# Patient Record
Sex: FEMALE | Race: BLACK OR AFRICAN AMERICAN | URBAN - METROPOLITAN AREA
[De-identification: names, ages, dates, MRNs, and addresses within clinical notes are randomized per-mention and may not be internally consistent; named-entity substitution may affect disease eponyms.]

---

## 2022-08-25 ENCOUNTER — HOSPITAL ENCOUNTER (EMERGENCY)
Age: 67
Discharge: HOME | End: 2022-08-25
Attending: FAMILY MEDICINE
Payer: MEDICARE

## 2022-08-25 ENCOUNTER — APPOINTMENT (OUTPATIENT)
Dept: CT IMAGING | Age: 67
End: 2022-08-25
Attending: FAMILY MEDICINE
Payer: MEDICARE

## 2022-08-25 ENCOUNTER — APPOINTMENT (OUTPATIENT)
Dept: GENERAL RADIOLOGY | Age: 67
End: 2022-08-25
Attending: FAMILY MEDICINE
Payer: MEDICARE

## 2022-08-25 VITALS
HEIGHT: 62 IN | HEART RATE: 79 BPM | RESPIRATION RATE: 18 BRPM | TEMPERATURE: 98.6 F | OXYGEN SATURATION: 99 % | BODY MASS INDEX: 30.73 KG/M2 | WEIGHT: 167 LBS | SYSTOLIC BLOOD PRESSURE: 149 MMHG | DIASTOLIC BLOOD PRESSURE: 71 MMHG

## 2022-08-25 DIAGNOSIS — M51.36 DEGENERATIVE DISC DISEASE, LUMBAR: Primary | ICD-10-CM

## 2022-08-25 DIAGNOSIS — M25.551 RIGHT HIP PAIN: ICD-10-CM

## 2022-08-25 PROCEDURE — 72131 CT LUMBAR SPINE W/O DYE: CPT

## 2022-08-25 PROCEDURE — 99284 EMERGENCY DEPT VISIT MOD MDM: CPT

## 2022-08-25 PROCEDURE — 73502 X-RAY EXAM HIP UNI 2-3 VIEWS: CPT

## 2022-08-25 PROCEDURE — 74011250636 HC RX REV CODE- 250/636: Performed by: FAMILY MEDICINE

## 2022-08-25 PROCEDURE — 96372 THER/PROPH/DIAG INJ SC/IM: CPT

## 2022-08-25 RX ORDER — LIDOCAINE 4 G/100G
1 PATCH TOPICAL ONCE
Status: DISCONTINUED | OUTPATIENT
Start: 2022-08-25 | End: 2022-08-25 | Stop reason: HOSPADM

## 2022-08-25 RX ORDER — PREDNISONE 20 MG/1
40 TABLET ORAL DAILY
Qty: 10 TABLET | Refills: 0 | Status: SHIPPED | OUTPATIENT
Start: 2022-08-25 | End: 2022-08-30

## 2022-08-25 RX ORDER — KETOROLAC TROMETHAMINE 15 MG/ML
15 INJECTION, SOLUTION INTRAMUSCULAR; INTRAVENOUS
Status: COMPLETED | OUTPATIENT
Start: 2022-08-25 | End: 2022-08-25

## 2022-08-25 RX ORDER — LIDOCAINE 4 G/100G
PATCH TOPICAL
Qty: 5 PATCH | Refills: 0 | Status: SHIPPED | OUTPATIENT
Start: 2022-08-25

## 2022-08-25 RX ADMIN — KETOROLAC TROMETHAMINE 15 MG: 15 INJECTION, SOLUTION INTRAMUSCULAR; INTRAVENOUS at 19:04

## 2022-08-25 NOTE — ED NOTES
Pt a&ox4. GCS 15. Pt self ambulated out of ED with discharge paperwork and personal belongings and daughter.

## 2022-08-25 NOTE — ED TRIAGE NOTES
Pt has right lower back pain that has now caused right hip pain, and right leg pain that feels like burning shooting pain. Pt has taken tylenol and meloxicam for her pain today. Pt denies any injuries or falls.

## 2022-08-27 NOTE — ED PROVIDER NOTES
EMERGENCY DEPARTMENT HISTORY AND PHYSICAL EXAM      Date: 8/25/2022  Patient Name: Soham Riggs    History of Presenting Illness     Chief Complaint   Patient presents with    Back Pain    Hip Pain    Leg Pain       History Provided By:     HPI: Soham Riggs, is a very pleasant 75 y.o. female presenting to the ED with a chief complaint of hip pain, back pain, leg pain. Patient has been experiencing right lower back pain that now goes into her right hip. Worse with walking. Denies any inciting injury. Occasional shooting pains down back of leg. No numbness tingling or weakness in extremities. No saddle anesthesia. No difficulty urinating or stooling. No fevers. The patient denies any other symptoms at this time. PCP: Nhi Ochoa MD    No current facility-administered medications on file prior to encounter. Current Outpatient Medications on File Prior to Encounter   Medication Sig Dispense Refill    atorvastatin (LIPITOR) 10 mg tablet Take  by mouth daily. amLODIPine (NORVASC) 5 mg tablet Take 5 mg by mouth daily. ketoconazole (NIZORAL) 2 % shampoo Apply  to affected area daily as needed. calcium carbonate-vitamin d2 600-200 mg-unit Cap Take  by mouth daily. Omega-3-DHA-EPA-Fish Oil (FISH OIL) 1,000 (120-180) mg Cap Take 2,000 mg by mouth nightly. MULTI-VITAMIN PO Take  by mouth daily. docusate sodium (COLACE) 100 mg capsule Take 200 mg by mouth nightly. glucosamines, hcl,sulf &acetyl (GLUCOSAMINE COMPLEX) 1 gram Tab Take  by mouth two (2) times a day. carbamide peroxide (DEBROX) 6.5 % otic solution Administer 5 Drops into each ear two (2) times a day. esomeprazole (NEXIUM) 40 mg capsule Take 40 mg by mouth nightly. hydrochlorothiazide (MICROZIDE) 12.5 mg capsule Take 12.5 mg by mouth every morning.          Past History     Past Medical History:  Past Medical History:   Diagnosis Date    Cold intolerance     GERD (gastroesophageal reflux disease)     Heart disease     HTN (hypertension)     Joint pain     Multiple stiff joints     Thyroid disease        Past Surgical History:  Past Surgical History:   Procedure Laterality Date    HX  SECTION      HX HYSTERECTOMY         Family History:  Family History   Problem Relation Age of Onset    Diabetes Sister     Hypertension Mother     Hypertension Father     Hypertension Sister        Social History:  Social History     Tobacco Use    Smoking status: Never    Smokeless tobacco: Never   Substance Use Topics    Alcohol use: No    Drug use: No       Allergies: Allergies   Allergen Reactions    Flu Medicine Hives and Rash         Review of Systems     Review of Systems   Constitutional:  Negative for activity change, appetite change, chills, fatigue and fever. HENT:  Negative for congestion and sore throat. Eyes:  Negative for photophobia and visual disturbance. Respiratory:  Negative for cough, shortness of breath and wheezing. Cardiovascular:  Negative for chest pain, palpitations and leg swelling. Gastrointestinal:  Negative for abdominal pain, diarrhea, nausea and vomiting. Endocrine: Negative for cold intolerance and heat intolerance. Musculoskeletal:  Positive for back pain. Negative for gait problem and joint swelling. Hip pain   Skin:  Negative for color change and rash. Neurological:  Negative for dizziness and headaches. Physical Exam     Physical Exam  Constitutional:       Appearance: She is well-developed. HENT:      Head: Normocephalic and atraumatic. Mouth/Throat:      Mouth: Mucous membranes are moist.      Pharynx: Oropharynx is clear. Eyes:      Conjunctiva/sclera: Conjunctivae normal.      Pupils: Pupils are equal, round, and reactive to light. Cardiovascular:      Rate and Rhythm: Normal rate and regular rhythm. Heart sounds: No murmur heard. Pulmonary:      Effort: No respiratory distress. Breath sounds: No stridor.  No wheezing, rhonchi or rales. Abdominal:      General: There is no distension. Tenderness: There is no abdominal tenderness. There is no rebound. Musculoskeletal:      Cervical back: Normal range of motion and neck supple. Comments: Mild tenderness to palpation to mid lumbar spine. No deformity or step-off. Tenderness over right greater trochanter. Patient ambulatory. No leg length discrepancy. No internal nor external rotation. Skin:     General: Skin is warm and dry. Neurological:      General: No focal deficit present. Mental Status: She is alert and oriented to person, place, and time. Psychiatric:         Mood and Affect: Mood normal.         Behavior: Behavior normal.       Lab and Diagnostic Study Results     Labs -   No results found for this or any previous visit (from the past 12 hour(s)). Radiologic Studies -   @lastxrresult@  CT Results  (Last 48 hours)                 08/25/22 8195  CT SPINE LUMB WO CONT Final result    Impression:      1. No evidence of fracture or subluxation. 2.  Degenerative changes in lower lumbar spine as described        Narrative:  INDICATION: Lower back pain       COMPARISON: No comparisons. Technique: Following conventional myelogram performed earlier the same day,   axial CT imaging was performed through the lumbar spine. Sagittal and coronal   reconstructions were obtained. CT dose reduction was achieved through the use of a standardized protocol   tailored for this examination and automatic exposure control for dose   modulation. FINDINGS:       No evidence of fracture or subluxation. T12-L1: The spinal canal and foramina are patent. L1-2: The spinal canal and foramina are patent. L2-3: Mild disc bulge without canal stenosis or foraminal narrowing. .       L3-4: Mild concentric disc bulge and facet and ligamentum flavum hypertrophy   with mild canal stenosis. No significant foraminal narrowing. L4-5: Concentric disc bulge with mild to moderate canal stenosis and facet and   ligamentum flavum hypertrophy. Mild left foraminal narrowing. Blanchie Bevels L5-S1: Facet hypertrophy. Mild disc bulge. No significant canal stenosis. No   significant foraminal narrowing. CXR Results  (Last 48 hours)      None              Medical Decision Making   - I am the first provider for this patient. - I reviewed the vital signs, available nursing notes, past medical history, past surgical history, family history and social history. - Initial assessment performed. The patients presenting problems have been discussed, and they are in agreement with the care plan formulated and outlined with them. I have encouraged them to ask questions as they arise throughout their visit. Vital Signs-Reviewed the patient's vital signs. No data found. ED Course/ Provider Notes (Medical Decision Making):     Patient presented to the emergency department with the aforementioned chief complaint. On examination the patient is nontoxic. Vitals were reviewed per above. No signs of infectious process no cauda equina. CT lumbar spine demonstrates degenerative changes. Entire report reviewed with patient. Hip x-ray negative. Discussed supportive measures for symptoms. Information to follow-up with orthopedic spine on outpatient basis. Jodie Hernandez was given a thorough list of signs and symptoms that would warrant an immediate return to the emergency department. Otherwise Jodie Hernandez will follow up with PCP. Procedures   Medical Decision Makingedical Decision Making  Performed by: Ian Cole DO  Procedures  None       Disposition   Disposition:     Home     All of the diagnostic tests were reviewed and questions answered. Diagnosis, care plan and treatment options were discussed. The patient understands the instructions and will follow up as directed.  The patients results have been reviewed with them.  They have been counseled regarding their diagnosis. The patient verbally convey understanding and agreement of the signs, symptoms, diagnosis, treatment and prognosis and additionally agrees to follow up as recommended with their PCP in 24 - 48 hours. They also agree with the care-plan and convey that all of their questions have been answered. I have also put together some discharge instructions for them that include: 1) educational information regarding their diagnosis, 2) how to care for their diagnosis at home, as well a 3) list of reasons why they would want to return to the ED prior to their follow-up appointment, should their condition change. DISCHARGE PLAN:    1. Cannot display discharge medications since this patient is not currently admitted. 2.   Follow-up Information       Follow up With Specialties Details Why Contact Info    Your primary care doctor  Schedule an appointment as soon as possible for a visit in 2 days      [facility] [address]  Call   [address]  [phone]              3.  Return to ED if worse       4. Discharge Medication List as of 8/25/2022  7:01 PM        START taking these medications    Details   predniSONE (DELTASONE) 20 mg tablet Take 2 Tablets by mouth daily for 5 days. With Breakfast, Normal, Disp-10 Tablet, R-0      lidocaine 4 % patch Apply to affected area daily not to exceed 12 hours a day, Normal, Disp-5 Patch, R-0           CONTINUE these medications which have NOT CHANGED    Details   atorvastatin (LIPITOR) 10 mg tablet Take  by mouth daily. Historical Med      amLODIPine (NORVASC) 5 mg tablet Take 5 mg by mouth daily. Historical Med, 5 mg      ketoconazole (NIZORAL) 2 % shampoo Apply  to affected area daily as needed. Historical Med      calcium carbonate-vitamin d2 600-200 mg-unit Cap Take  by mouth daily. Historical Med      Omega-3-DHA-EPA-Fish Oil (FISH OIL) 1,000 (120-180) mg Cap Take 2,000 mg by mouth nightly. Historical Med, 2,000 mg      MULTI-VITAMIN PO Take  by mouth daily. Historical Med      docusate sodium (COLACE) 100 mg capsule Take 200 mg by mouth nightly. Historical Med, 200 mg      glucosamines, hcl,sulf &acetyl (GLUCOSAMINE COMPLEX) 1 gram Tab Take  by mouth two (2) times a day. Historical Med      carbamide peroxide (DEBROX) 6.5 % otic solution Administer 5 Drops into each ear two (2) times a day. Historical Med, 5 Drop      esomeprazole (NEXIUM) 40 mg capsule Take 40 mg by mouth nightly. Historical Med, 40 mg      hydrochlorothiazide (MICROZIDE) 12.5 mg capsule Take 12.5 mg by mouth every morning. Historical Med, 12.5 mg               Diagnosis     Clinical Impression:    1. Degenerative disc disease, lumbar    2. Right hip pain        Attestations:    Murali Sullivan, DO    Please note that this dictation was completed with [employer], the computer voice recognition software. Quite often unanticipated grammatical, syntax, homophones, and other interpretive errors are inadvertently transcribed by the computer software. Please disregard these errors. Please excuse any errors that have escaped final proofreading. Thank you.

## 2022-10-20 ENCOUNTER — HOSPITAL ENCOUNTER (EMERGENCY)
Age: 67
Discharge: HOME | End: 2022-10-20
Attending: FAMILY MEDICINE | Admitting: FAMILY MEDICINE
Payer: MEDICARE

## 2022-10-20 VITALS
SYSTOLIC BLOOD PRESSURE: 146 MMHG | TEMPERATURE: 98.3 F | RESPIRATION RATE: 19 BRPM | DIASTOLIC BLOOD PRESSURE: 80 MMHG | HEIGHT: 62 IN | WEIGHT: 159 LBS | BODY MASS INDEX: 29.26 KG/M2 | HEART RATE: 72 BPM | OXYGEN SATURATION: 96 %

## 2022-10-20 DIAGNOSIS — M54.17 LUMBOSACRAL RADICULOPATHY: Primary | ICD-10-CM

## 2022-10-20 PROCEDURE — 99284 EMERGENCY DEPT VISIT MOD MDM: CPT | Performed by: FAMILY MEDICINE

## 2022-10-20 PROCEDURE — 74011250637 HC RX REV CODE- 250/637

## 2022-10-20 PROCEDURE — 74011250636 HC RX REV CODE- 250/636

## 2022-10-20 PROCEDURE — 74011000250 HC RX REV CODE- 250

## 2022-10-20 PROCEDURE — 96372 THER/PROPH/DIAG INJ SC/IM: CPT | Performed by: FAMILY MEDICINE

## 2022-10-20 RX ORDER — ACETAMINOPHEN 325 MG/1
975 TABLET ORAL
Status: COMPLETED | OUTPATIENT
Start: 2022-10-20 | End: 2022-10-20

## 2022-10-20 RX ORDER — LIDOCAINE 4 G/100G
1 PATCH TOPICAL EVERY 24 HOURS
Status: DISCONTINUED | OUTPATIENT
Start: 2022-10-20 | End: 2022-10-20 | Stop reason: HOSPADM

## 2022-10-20 RX ADMIN — METHYLPREDNISOLONE SODIUM SUCCINATE 125 MG: 125 INJECTION, POWDER, FOR SOLUTION INTRAMUSCULAR; INTRAVENOUS at 14:08

## 2022-10-20 RX ADMIN — ACETAMINOPHEN 975 MG: 325 TABLET ORAL at 14:08

## 2022-10-20 NOTE — ED TRIAGE NOTES
Pt reports right back pain for 2 days not relieved by otc pain medication. Pt states she took 800 mg of motrin at 1000 today.

## 2022-10-20 NOTE — ED PROVIDER NOTES
EMERGENCY DEPARTMENT HISTORY AND PHYSICAL EXAM      Date: 10/20/2022  Patient Name: Silvia Carrion    History of Presenting Illness     Chief Complaint   Patient presents with    Back Pain     Chronic back pain with and exacerbation over the last 2 days with pt stating pain 8/10 to rt lower back at shooting down right leg. Hx of this pain in past but states  \"not this bad\". Pt then states she thinks she was seen here in August for similar complaint. History Provided By: Patient    HPI: Silvia Carrion, 75 y.o. female history of GERD, hypertension, and radiculopathy presents with low back pain x2 days. Patient states she was receiving physical therapy for her low back pain that ended last Friday. She went for a walk on Sunday and the pain returned on Tuesday. She has taken ibuprofen and gabapentin to treat the pain without relief. Her gabapentin prescription is new and she has taken 2 doses. She quit taking Tylenol in August.  She denies fever, IV drug use, unintended weight loss, loss of appetite, abdominal pain, upper back pain, nausea, vomiting, diarrhea, dysuria, vaginal discharge/bleeding, constipation, immunosuppression, weakness, or numbness. Denies sudden onset or other unexplained changes in bladder or bowel control (retention or incontinence). Denies sudden onset or otherwise unexplained lower extremity weakness. Patient not currently on anti-coagulation and without recent blunt back trauma. Denies unexplained weight loss, night sweats. No history of malignancy, tuberculosis, immunosuppression. Denies IVDU  No known history of aortic pathology. Denies h/o urinary tract stones. Denies dysuria. Denies F/N/V/D. She does endorse a tingling sensation in bilateral toes and right upper thigh that is not changed from previous presentation. There are no other complaints, changes, or physical findings at this time.     PCP: David Ctoa MD    No current facility-administered medications on file prior to encounter. Current Outpatient Medications on File Prior to Encounter   Medication Sig Dispense Refill    lidocaine 4 % patch Apply to affected area daily not to exceed 12 hours a day 5 Patch 0    atorvastatin (LIPITOR) 10 mg tablet Take  by mouth daily. amLODIPine (NORVASC) 5 mg tablet Take 5 mg by mouth daily. ketoconazole (NIZORAL) 2 % shampoo Apply  to affected area daily as needed. calcium carbonate-vitamin d2 600-200 mg-unit Cap Take  by mouth daily. Omega-3-DHA-EPA-Fish Oil (FISH OIL) 1,000 (120-180) mg Cap Take 2,000 mg by mouth nightly. MULTI-VITAMIN PO Take  by mouth daily. docusate sodium (COLACE) 100 mg capsule Take 200 mg by mouth nightly. glucosamines, hcl,sulf &acetyl (GLUCOSAMINE COMPLEX) 1 gram Tab Take  by mouth two (2) times a day. carbamide peroxide (DEBROX) 6.5 % otic solution Administer 5 Drops into each ear two (2) times a day. esomeprazole (NEXIUM) 40 mg capsule Take 40 mg by mouth nightly. hydrochlorothiazide (MICROZIDE) 12.5 mg capsule Take 12.5 mg by mouth every morning. Past History     Past Medical History:  Past Medical History:   Diagnosis Date    Cold intolerance     GERD (gastroesophageal reflux disease)     Heart disease     HTN (hypertension)     Joint pain     Multiple stiff joints     Thyroid disease        Past Surgical History:  Past Surgical History:   Procedure Laterality Date    HX  SECTION      HX HYSTERECTOMY         Family History:  Family History   Problem Relation Age of Onset    Diabetes Sister     Hypertension Mother     Hypertension Father     Hypertension Sister        Social History:  Social History     Tobacco Use    Smoking status: Never    Smokeless tobacco: Never   Substance Use Topics    Alcohol use: No    Drug use: No       Allergies: Allergies   Allergen Reactions    Flu Medicine Hives and Rash       Review of Systems   Review of Systems   Constitutional: Negative. Negative for appetite change, chills, fatigue, fever and unexpected weight change. HENT: Negative. Eyes: Negative. Respiratory: Negative. Negative for cough and shortness of breath. Cardiovascular: Negative. Negative for chest pain and leg swelling. Gastrointestinal: Negative. Negative for abdominal pain, constipation, diarrhea, nausea and vomiting. Endocrine: Negative. Genitourinary: Negative. Negative for decreased urine volume, dysuria, flank pain, frequency, genital sores, hematuria, pelvic pain, urgency, vaginal bleeding and vaginal discharge. Musculoskeletal:  Positive for back pain. Negative for joint swelling, myalgias and neck pain. Skin: Negative. Negative for rash. Allergic/Immunologic: Negative. Neurological: Negative. Negative for dizziness, tremors, syncope, weakness, light-headedness, numbness and headaches. Hematological: Negative. Psychiatric/Behavioral: Negative. Negative for confusion. Physical Exam   Physical Exam  Vitals and nursing note reviewed. Constitutional:       General: She is not in acute distress. Appearance: Normal appearance. HENT:      Head: Normocephalic. Nose: Nose normal.      Mouth/Throat:      Mouth: Mucous membranes are moist.      Pharynx: Oropharynx is clear. Eyes:      Extraocular Movements: Extraocular movements intact. Pupils: Pupils are equal, round, and reactive to light. Cardiovascular:      Rate and Rhythm: Normal rate and regular rhythm. Pulses: Normal pulses. Heart sounds: Murmur heard. Pulmonary:      Effort: Pulmonary effort is normal. No respiratory distress. Breath sounds: Normal breath sounds. Abdominal:      General: Bowel sounds are normal. There is no distension. Palpations: Abdomen is soft. There is no mass. Tenderness: There is no abdominal tenderness. There is no right CVA tenderness, left CVA tenderness or guarding.    Musculoskeletal:         General: Normal range of motion. Cervical back: Normal range of motion. Skin:     General: Skin is warm and dry. Findings: No bruising or rash. Neurological:      General: No focal deficit present. Mental Status: She is alert and oriented to person, place, and time. Sensory: No sensory deficit. Motor: No weakness. Coordination: Coordination normal.      Gait: Gait normal.      Deep Tendon Reflexes: Reflexes normal.   Psychiatric:         Mood and Affect: Mood normal.         Behavior: Behavior normal.       Lab and Diagnostic Study Results   Labs -   No results found for this or any previous visit (from the past 12 hour(s)). Radiologic Studies -   @lastxrresult@  CT Results  (Last 48 hours)      None          CXR Results  (Last 48 hours)      None            Medical Decision Making and ED Course   Differential Diagnosis & Medical Decision Making Provider Note:     - I am the first provider for this patient. I reviewed the vital signs, available nursing notes, past medical history, past surgical history, family history and social history. The patients presenting problems have been discussed, and they are in agreement with the care plan formulated and outlined with them. I have encouraged them to ask questions as they arise throughout their visit. Vital Signs-Reviewed the patient's vital signs. No data found. ED Course:      Patient presents with back pain, worsened by nonspecific positional changes, and improved by rest. I completed a structured, evidence-based clinical evaluation to screen for acute non-traumatic spinal emergencies. The patient has a normal detailed neurologic exam and a low red flag score. Differential diagnoses include spinal stenosis versus musculoskeletal spasm / strain versus sciatica. Right straight leg raise positive.   Generalized lower back tenderness to palpation on exam. Presentation not consistent with malignancy (lack of malignancy hx, B symptoms, weight loss), fracture (no trauma, no bony tenderness to palpation, osteopenia risk, or deformity), cauda equina (no bowel or urinary incontinence/retention, no saddle anesthesia, no distal weakness), AAA, viscus perforation, osteomyelitis, discitis, cord compression, psoas abscess, or epidural abscess (no IVDU, vertebral tenderness, fever, recent steroid use or surgeries), renal colic/infarct, pyelonephritis (afebrile, no CVAT, no urinary symptoms). Given the clinical picture, no indication for imaging at this time. Presentation appears most likely secondary to non-emergent musculoskeletal etiology vs non-emergent disc herniation. Case discussed with Dr. Pak Prudent. The risk of further workup is higher than the likelihood of the patient having a spinal epidural abscess or other dangerous emergency spinal condition. Laz Landeros and I have discussed the diagnosis and risks, and we agree discharge home with close follow-up is appropriate. Patient instructed to consider further imaging and work up through their primary care physician if symptoms persist. Red flag symptoms warranting return to ED discussed. I have recommended rest, avoiding heavy lifting until better, use of intermittent heat (avoid sleeping on a heating pad), and use of OTC NSAID's (Advil, Aleve etc) or acetaminophen prn for pain. Advised patient on supportive therapies such as relaxation techniques, weight loss, ergonomic therapies. Instructed patient on low back pain ROM exercises. At time of discharge patient able to ambulate and vitals stable. No emergent MRI indicated. I have discussed with the patient my clinical impression and the result of an evidence-based clinical evaluation to screen for spinal epidural abscess and other spinal emergencies, as well as the risk of further testing and hospitalization. The evidence shows that the risk for an acute spinal emergency is less than 1%.  Although the risk of an acute spinal emergency has not been completely eliminated, the risks of further testing likely exceed any potential benefit, and the patient agrees with not pursuing further emergent evaluation for causes of back pain at this time. Procedures   Performed by: Merlin Bedolla NP  Procedures      Disposition   Disposition: DC- Adult Discharges: All of the diagnostic tests were reviewed and questions answered. Diagnosis, care plan and treatment options were discussed. The patient understands the instructions and will follow up as directed. The patients results have been reviewed with them. They have been counseled regarding their diagnosis. The patient verbally convey understanding and agreement of the signs, symptoms, diagnosis, treatment and prognosis and additionally agrees to follow up as recommended with their PCP in 24 - 48 hours. They also agree with the care-plan and convey that all of their questions have been answered. I have also put together some discharge instructions for them that include: 1) educational information regarding their diagnosis, 2) how to care for their diagnosis at home, as well a 3) list of reasons why they would want to return to the ED prior to their follow-up appointment, should their condition change. DISCHARGE PLAN:  1. Current Discharge Medication List        CONTINUE these medications which have NOT CHANGED    Details   lidocaine 4 % patch Apply to affected area daily not to exceed 12 hours a day  Qty: 5 Patch, Refills: 0      atorvastatin (LIPITOR) 10 mg tablet Take  by mouth daily. Associated Diagnoses: Unspecified disorder of thyroid      amLODIPine (NORVASC) 5 mg tablet Take 5 mg by mouth daily. Associated Diagnoses: Unspecified disorder of thyroid      ketoconazole (NIZORAL) 2 % shampoo Apply  to affected area daily as needed. calcium carbonate-vitamin d2 600-200 mg-unit Cap Take  by mouth daily.       Omega-3-DHA-EPA-Fish Oil (FISH OIL) 1,000 (120-180) mg Cap Take 2,000 mg by mouth nightly. MULTI-VITAMIN PO Take  by mouth daily. docusate sodium (COLACE) 100 mg capsule Take 200 mg by mouth nightly. glucosamines, hcl,sulf &acetyl (GLUCOSAMINE COMPLEX) 1 gram Tab Take  by mouth two (2) times a day. carbamide peroxide (DEBROX) 6.5 % otic solution Administer 5 Drops into each ear two (2) times a day. esomeprazole (NEXIUM) 40 mg capsule Take 40 mg by mouth nightly. hydrochlorothiazide (MICROZIDE) 12.5 mg capsule Take 12.5 mg by mouth every morning. 2.   Follow-up Information       Follow up With Specialties Details Why Contact Info    Yusra Jaquez MD Family Medicine   [facility]  [address] [facility] [address]      [address] [facility]  If symptoms worsen, As needed [address] [place] [id number] [phone]    [facility]  Schedule an appointment as soon as possible for a visit   [address] Located within [facility]  [phone]          3. Return to ED if worse   4. Discharge Medication List as of 10/20/2022  1:56 PM          Diagnosis/Clinical Impression     Clinical Impression:   1. Lumbosacral radiculopathy        Attestations: Lino Madison NP, am the primary clinician of record. Please note that this dictation was completed with [employer], the [employer] voice recognition software. Quite often unanticipated grammatical, syntax, homophones, and other interpretive errors are inadvertently transcribed by the computer software. Please disregard these errors. Please excuse any errors that have escaped final proofreading. Thank you.

## 2023-01-13 ENCOUNTER — TRANSCRIBE ORDER (OUTPATIENT)
Dept: GENERAL RADIOLOGY | Age: 68
End: 2023-01-13

## 2023-01-13 ENCOUNTER — HOSPITAL ENCOUNTER (OUTPATIENT)
Dept: GENERAL RADIOLOGY | Age: 68
End: 2023-01-13
Payer: MEDICARE

## 2023-01-13 DIAGNOSIS — M25.551 RIGHT HIP PAIN: ICD-10-CM

## 2023-01-13 DIAGNOSIS — M25.551 RIGHT HIP PAIN: Primary | ICD-10-CM

## 2023-01-13 PROCEDURE — 73502 X-RAY EXAM HIP UNI 2-3 VIEWS: CPT

## 2023-05-19 RX ORDER — ATORVASTATIN CALCIUM 10 MG/1
TABLET, FILM COATED ORAL DAILY
COMMUNITY

## 2023-05-19 RX ORDER — PSEUDOEPHEDRINE HCL 30 MG
200 TABLET ORAL NIGHTLY
COMMUNITY
Start: 2011-03-07

## 2023-05-19 RX ORDER — AMLODIPINE BESYLATE 5 MG/1
5 TABLET ORAL DAILY
COMMUNITY

## 2023-05-19 RX ORDER — LIDOCAINE 4 G/G
PATCH TOPICAL
COMMUNITY
Start: 2022-08-25

## 2023-05-19 RX ORDER — KETOCONAZOLE 20 MG/ML
SHAMPOO TOPICAL DAILY PRN
COMMUNITY

## 2023-05-19 RX ORDER — HYDROCHLOROTHIAZIDE 12.5 MG/1
12.5 CAPSULE, GELATIN COATED ORAL
COMMUNITY
Start: 2011-03-07

## 2023-05-19 RX ORDER — GLUC/MSM/COLGN2/HYAL/ANTIARTH3 375-375-20
TABLET ORAL DAILY
COMMUNITY
Start: 2011-03-07

## 2023-05-19 RX ORDER — ESOMEPRAZOLE MAGNESIUM 40 MG/1
40 CAPSULE, DELAYED RELEASE ORAL NIGHTLY
COMMUNITY
Start: 2011-03-07